# Patient Record
Sex: FEMALE | Race: WHITE | Employment: STUDENT | URBAN - METROPOLITAN AREA
[De-identification: names, ages, dates, MRNs, and addresses within clinical notes are randomized per-mention and may not be internally consistent; named-entity substitution may affect disease eponyms.]

---

## 2017-06-20 ENCOUNTER — HOSPITAL ENCOUNTER (EMERGENCY)
Age: 42
Discharge: HOME | End: 2017-06-20
Attending: EMERGENCY MEDICINE
Payer: MEDICAID

## 2017-06-20 ENCOUNTER — APPOINTMENT (OUTPATIENT)
Dept: GENERAL RADIOLOGY | Age: 42
End: 2017-06-20
Attending: EMERGENCY MEDICINE
Payer: MEDICAID

## 2017-06-20 ENCOUNTER — APPOINTMENT (OUTPATIENT)
Dept: CT IMAGING | Age: 42
End: 2017-06-20
Attending: EMERGENCY MEDICINE
Payer: MEDICAID

## 2017-06-20 ENCOUNTER — APPOINTMENT (OUTPATIENT)
Dept: ULTRASOUND IMAGING | Age: 42
End: 2017-06-20
Attending: EMERGENCY MEDICINE
Payer: MEDICAID

## 2017-06-20 VITALS
TEMPERATURE: 98 F | DIASTOLIC BLOOD PRESSURE: 88 MMHG | OXYGEN SATURATION: 97 % | RESPIRATION RATE: 16 BRPM | SYSTOLIC BLOOD PRESSURE: 126 MMHG | HEIGHT: 66 IN | WEIGHT: 284 LBS | BODY MASS INDEX: 45.64 KG/M2 | HEART RATE: 84 BPM

## 2017-06-20 DIAGNOSIS — R07.89 ATYPICAL CHEST PAIN: Primary | ICD-10-CM

## 2017-06-20 PROCEDURE — 71010 XR CHEST AP PORTABLE  (CPT=71010): CPT | Performed by: EMERGENCY MEDICINE

## 2017-06-20 PROCEDURE — 93005 ELECTROCARDIOGRAM TRACING: CPT

## 2017-06-20 PROCEDURE — 96361 HYDRATE IV INFUSION ADD-ON: CPT

## 2017-06-20 PROCEDURE — 81003 URINALYSIS AUTO W/O SCOPE: CPT | Performed by: EMERGENCY MEDICINE

## 2017-06-20 PROCEDURE — 96374 THER/PROPH/DIAG INJ IV PUSH: CPT

## 2017-06-20 PROCEDURE — 81025 URINE PREGNANCY TEST: CPT

## 2017-06-20 PROCEDURE — 93010 ELECTROCARDIOGRAM REPORT: CPT

## 2017-06-20 PROCEDURE — 99285 EMERGENCY DEPT VISIT HI MDM: CPT

## 2017-06-20 PROCEDURE — 76700 US EXAM ABDOM COMPLETE: CPT | Performed by: EMERGENCY MEDICINE

## 2017-06-20 PROCEDURE — 83690 ASSAY OF LIPASE: CPT | Performed by: EMERGENCY MEDICINE

## 2017-06-20 PROCEDURE — C9113 INJ PANTOPRAZOLE SODIUM, VIA: HCPCS | Performed by: EMERGENCY MEDICINE

## 2017-06-20 PROCEDURE — 84484 ASSAY OF TROPONIN QUANT: CPT | Performed by: EMERGENCY MEDICINE

## 2017-06-20 PROCEDURE — 71275 CT ANGIOGRAPHY CHEST: CPT | Performed by: EMERGENCY MEDICINE

## 2017-06-20 PROCEDURE — 85025 COMPLETE CBC W/AUTO DIFF WBC: CPT | Performed by: EMERGENCY MEDICINE

## 2017-06-20 PROCEDURE — 96375 TX/PRO/DX INJ NEW DRUG ADDON: CPT

## 2017-06-20 PROCEDURE — 80053 COMPREHEN METABOLIC PANEL: CPT | Performed by: EMERGENCY MEDICINE

## 2017-06-20 RX ORDER — ONDANSETRON 2 MG/ML
4 INJECTION INTRAMUSCULAR; INTRAVENOUS ONCE
Status: COMPLETED | OUTPATIENT
Start: 2017-06-20 | End: 2017-06-20

## 2017-06-20 RX ORDER — SODIUM CHLORIDE 9 MG/ML
INJECTION, SOLUTION INTRAVENOUS ONCE
Status: COMPLETED | OUTPATIENT
Start: 2017-06-20 | End: 2017-06-20

## 2017-06-20 RX ORDER — OMEPRAZOLE 20 MG/1
20 CAPSULE, DELAYED RELEASE ORAL DAILY
Qty: 30 CAPSULE | Refills: 0 | Status: SHIPPED | OUTPATIENT
Start: 2017-06-20 | End: 2018-06-20

## 2017-06-20 RX ORDER — KETOROLAC TROMETHAMINE 30 MG/ML
30 INJECTION, SOLUTION INTRAMUSCULAR; INTRAVENOUS ONCE
Status: COMPLETED | OUTPATIENT
Start: 2017-06-20 | End: 2017-06-20

## 2017-06-20 RX ORDER — CETIRIZINE HYDROCHLORIDE 10 MG/1
10 TABLET ORAL DAILY
COMMUNITY

## 2017-06-20 NOTE — ED PROVIDER NOTES
Patient Seen in: [facility] In [place]    History   Patient presents with:  Chest Pain Angina (cardiovascular)    Stated Complaint: chest pain/abd pain    HPI    57-year-old female comes the hospital to complaint of having difficulty 0741 97.5 °F (36.4 °C)   Temp src 06/20/17 0741 Temporal   SpO2 06/20/17 0741 96 %   O2 Device 06/20/17 0741 None (Room air)       Current:/82 mmHg  Pulse 73  Temp(Src) 98 °F (36.7 °C) (Temporal)  Resp 20  Ht 167.6 cm (5' 6\")  Wt 128.822 kg  BMI 45. MD (06/20/17 10:40:23)     Impression:     CONCLUSION:  No pulmonary embolism identified.           Dictated by: Abdullahi Burns MD on 6/20/2017 at 10:13       Approved by: Abdullahi Burns MD               Narrative:     PROCEDURE:  CT ANGIOGRAM OF THE DODIE echogenicity. No significant masses. BILIARY:  Normal appearing gallbladder, intrahepatic ducts, and common bile duct.  Common bile duct diameter is 3 mm.   PANCREAS:  Normal.  SPLEEN:  Normal.  KIDNEYS:  Normal.  Right kidney measures 10.9 cm.  Left kid[address]          Kelly Conner MD  [address]   [address] [place] [address]    Schedule an appointment as soon as possible for a visit in 2 days        Medications Prescribed:  Current Discharge Medication List    START taking the

## 2017-06-20 NOTE — ED NOTES
MD in the room. Explained to pt all lab results and discharge instructions. Voiced of understanding.

## 2019-11-04 ENCOUNTER — HOSPITAL ENCOUNTER (EMERGENCY)
Age: 44
Discharge: HOME | End: 2019-11-04
Attending: EMERGENCY MEDICINE

## 2019-11-04 ENCOUNTER — APPOINTMENT (OUTPATIENT)
Dept: GENERAL RADIOLOGY | Age: 44
End: 2019-11-04
Attending: EMERGENCY MEDICINE

## 2019-11-04 ENCOUNTER — APPOINTMENT (OUTPATIENT)
Dept: CT IMAGING | Age: 44
End: 2019-11-04
Attending: EMERGENCY MEDICINE

## 2019-11-04 VITALS
OXYGEN SATURATION: 98 % | RESPIRATION RATE: 18 BRPM | WEIGHT: 293 LBS | SYSTOLIC BLOOD PRESSURE: 118 MMHG | HEART RATE: 99 BPM | TEMPERATURE: 100 F | DIASTOLIC BLOOD PRESSURE: 86 MMHG | BODY MASS INDEX: 47.09 KG/M2 | HEIGHT: 66 IN

## 2019-11-04 DIAGNOSIS — J18.9 COMMUNITY ACQUIRED PNEUMONIA OF RIGHT UPPER LOBE OF LUNG: Primary | ICD-10-CM

## 2019-11-04 PROCEDURE — 99285 EMERGENCY DEPT VISIT HI MDM: CPT

## 2019-11-04 PROCEDURE — 96375 TX/PRO/DX INJ NEW DRUG ADDON: CPT

## 2019-11-04 PROCEDURE — 87502 INFLUENZA DNA AMP PROBE: CPT | Performed by: EMERGENCY MEDICINE

## 2019-11-04 PROCEDURE — 71046 X-RAY EXAM CHEST 2 VIEWS: CPT | Performed by: EMERGENCY MEDICINE

## 2019-11-04 PROCEDURE — 81003 URINALYSIS AUTO W/O SCOPE: CPT | Performed by: EMERGENCY MEDICINE

## 2019-11-04 PROCEDURE — 93005 ELECTROCARDIOGRAM TRACING: CPT

## 2019-11-04 PROCEDURE — 96361 HYDRATE IV INFUSION ADD-ON: CPT

## 2019-11-04 PROCEDURE — 96365 THER/PROPH/DIAG IV INF INIT: CPT

## 2019-11-04 PROCEDURE — 85025 COMPLETE CBC W/AUTO DIFF WBC: CPT | Performed by: EMERGENCY MEDICINE

## 2019-11-04 PROCEDURE — 70450 CT HEAD/BRAIN W/O DYE: CPT | Performed by: EMERGENCY MEDICINE

## 2019-11-04 PROCEDURE — 80053 COMPREHEN METABOLIC PANEL: CPT | Performed by: EMERGENCY MEDICINE

## 2019-11-04 PROCEDURE — 93010 ELECTROCARDIOGRAM REPORT: CPT

## 2019-11-04 RX ORDER — LEVOFLOXACIN 5 MG/ML
500 INJECTION, SOLUTION INTRAVENOUS ONCE
Status: COMPLETED | OUTPATIENT
Start: 2019-11-04 | End: 2019-11-04

## 2019-11-04 RX ORDER — LEVOFLOXACIN 500 MG/1
500 TABLET, FILM COATED ORAL DAILY
Qty: 10 TABLET | Refills: 0 | Status: SHIPPED | OUTPATIENT
Start: 2019-11-04 | End: 2019-11-14

## 2019-11-04 RX ORDER — KETOROLAC TROMETHAMINE 30 MG/ML
30 INJECTION, SOLUTION INTRAMUSCULAR; INTRAVENOUS ONCE
Status: COMPLETED | OUTPATIENT
Start: 2019-11-04 | End: 2019-11-04

## 2019-11-04 RX ORDER — ACETAMINOPHEN 500 MG
1000 TABLET ORAL ONCE
Status: DISCONTINUED | OUTPATIENT
Start: 2019-11-04 | End: 2019-11-04

## 2019-11-04 RX ORDER — MORPHINE SULFATE 4 MG/ML
4 INJECTION, SOLUTION INTRAMUSCULAR; INTRAVENOUS ONCE
Status: COMPLETED | OUTPATIENT
Start: 2019-11-04 | End: 2019-11-04

## 2019-11-04 RX ORDER — ONDANSETRON 4 MG/1
4 TABLET, ORALLY DISINTEGRATING ORAL EVERY 4 HOURS PRN
Qty: 10 TABLET | Refills: 0 | Status: SHIPPED | OUTPATIENT
Start: 2019-11-04 | End: 2019-11-11

## 2019-11-04 RX ORDER — DEXAMETHASONE SODIUM PHOSPHATE 4 MG/ML
10 VIAL (ML) INJECTION ONCE
Status: COMPLETED | OUTPATIENT
Start: 2019-11-04 | End: 2019-11-04

## 2019-11-04 RX ORDER — CODEINE PHOSPHATE AND GUAIFENESIN 10; 100 MG/5ML; MG/5ML
5 SOLUTION ORAL EVERY 6 HOURS PRN
Qty: 180 ML | Refills: 0 | Status: SHIPPED | OUTPATIENT
Start: 2019-11-04 | End: 2020-11-15

## 2019-11-04 RX ORDER — ONDANSETRON 2 MG/ML
4 INJECTION INTRAMUSCULAR; INTRAVENOUS ONCE
Status: COMPLETED | OUTPATIENT
Start: 2019-11-04 | End: 2019-11-04

## 2019-11-05 NOTE — ED PROVIDER NOTES
Patient Seen in: [facility] In [place]      History   Patient presents with:  Headache (neurologic)    Stated Complaint: headache     HPI    80-year-old female presents emergency department states she is been having a cough vomiting a pain distress, tearful  HEENT: Pupils equal react to light extraocular muscles intact no scleral icterus, mucous membranes are moist, there is no erythema or exudate in the posterior pharynx  Neck: Supple no JVD no lymphadenopathy no meningismus no carotid results for these tests on the individual orders. RAINBOW DRAW BLUE   RAINBOW DRAW LAVENDER   RAINBOW DRAW LIGHT GREEN   RAINBOW DRAW GOLD     EKG    Rate, intervals and axes as noted on EKG Report.   Rate: 124  Rhythm: Sinus Rhythm  Reading: Sinus tachyc Patient verbalizes and agrees with plan. Patient discharged in good condition.                 Disposition and Plan     Clinical Impression:  Community acquired pneumonia of right upper lobe of lung ([address]  (primary encounter diagnosis)    Disposition:  Dis

## 2020-11-15 ENCOUNTER — APPOINTMENT (OUTPATIENT)
Dept: GENERAL RADIOLOGY | Age: 45
End: 2020-11-15
Attending: PHYSICIAN ASSISTANT
Payer: COMMERCIAL

## 2020-11-15 ENCOUNTER — HOSPITAL ENCOUNTER (EMERGENCY)
Age: 45
Discharge: HOME | End: 2020-11-15
Attending: EMERGENCY MEDICINE
Payer: COMMERCIAL

## 2020-11-15 VITALS
HEIGHT: 65 IN | OXYGEN SATURATION: 99 % | BODY MASS INDEX: 48.82 KG/M2 | DIASTOLIC BLOOD PRESSURE: 101 MMHG | WEIGHT: 293 LBS | SYSTOLIC BLOOD PRESSURE: 216 MMHG | TEMPERATURE: 98 F | RESPIRATION RATE: 18 BRPM | HEART RATE: 92 BPM

## 2020-11-15 DIAGNOSIS — M25.562 ACUTE PAIN OF LEFT KNEE: Primary | ICD-10-CM

## 2020-11-15 DIAGNOSIS — S83.92XA SPRAIN OF LEFT KNEE, UNSPECIFIED LIGAMENT, INITIAL ENCOUNTER: ICD-10-CM

## 2020-11-15 DIAGNOSIS — I10 HYPERTENSION, UNSPECIFIED TYPE: ICD-10-CM

## 2020-11-15 PROCEDURE — 99284 EMERGENCY DEPT VISIT MOD MDM: CPT

## 2020-11-15 PROCEDURE — 73562 X-RAY EXAM OF KNEE 3: CPT | Performed by: PHYSICIAN ASSISTANT

## 2020-11-15 PROCEDURE — 99283 EMERGENCY DEPT VISIT LOW MDM: CPT

## 2020-11-15 RX ORDER — PAROXETINE HYDROCHLORIDE 40 MG/1
40 TABLET, FILM COATED ORAL EVERY MORNING
COMMUNITY

## 2020-11-15 RX ORDER — HYDROCODONE BITARTRATE AND ACETAMINOPHEN 5; 325 MG/1; MG/1
1 TABLET ORAL ONCE
Status: COMPLETED | OUTPATIENT
Start: 2020-11-15 | End: 2020-11-15

## 2020-11-15 RX ORDER — HYDROCODONE BITARTRATE AND ACETAMINOPHEN 5; 325 MG/1; MG/1
1-2 TABLET ORAL EVERY 6 HOURS PRN
Qty: 10 TABLET | Refills: 0 | Status: SHIPPED | OUTPATIENT
Start: 2020-11-15 | End: 2020-11-22

## 2020-11-15 RX ORDER — BUTALBITAL, ACETAMINOPHEN AND CAFFEINE 50; 325; 40 MG/1; MG/1; MG/1
1 TABLET ORAL EVERY 4 HOURS PRN
COMMUNITY

## 2020-11-16 NOTE — ED NOTES
Assisted pt to restroom via wheelchair, Pt educated about not taking her BP medications today, states that she did not take it because it makes her void more frequently. Educated her.

## 2020-11-16 NOTE — ED INITIAL ASSESSMENT (HPI)
Left knee pain over last 3-4 days. Renay Paiz \"pop\" when got out of chair and now unable to bear weight.

## 2020-11-16 NOTE — ED PROVIDER NOTES
Patient Seen in: [facility] In [place]      History   Patient presents with:  Musculoskeletal Problem    Stated Complaint: left knee injury    72-year-old obese  female with history of hypertension, but did not take her medic meniscus and no MCL laxity. Tenderness found. Medial joint line and patellar tendon tenderness noted. No lateral joint line, no MCL and no LCL tenderness noted. Skin:     General: Skin is warm and dry. Neurological:      Mental Status: She is alert. MD  [address] [facility]  [phone]      Follow up on Monday. Call for appointment.           Medications Prescribed:  Current Discharge Medication List    START taking these medications    HYDROcodone-acetaminophen 5-325 MG Or

## 2021-11-16 ENCOUNTER — HOSPITAL ENCOUNTER (OUTPATIENT)
Facility: HOSPITAL | Age: 46
Setting detail: OBSERVATION
Discharge: HOME | DRG: 177 | End: 2021-11-18
Attending: EMERGENCY MEDICINE | Admitting: HOSPITALIST
Payer: MEDICAID

## 2021-11-16 ENCOUNTER — APPOINTMENT (OUTPATIENT)
Dept: GENERAL RADIOLOGY | Age: 46
DRG: 177 | End: 2021-11-16
Attending: EMERGENCY MEDICINE
Payer: MEDICAID

## 2021-11-16 DIAGNOSIS — U07.1 COVID-19: Primary | ICD-10-CM

## 2021-11-16 PROCEDURE — 71045 X-RAY EXAM CHEST 1 VIEW: CPT | Performed by: EMERGENCY MEDICINE

## 2021-11-16 PROCEDURE — 3E0333Z INTRODUCTION OF ANTI-INFLAMMATORY INTO PERIPHERAL VEIN, PERCUTANEOUS APPROACH: ICD-10-PCS | Performed by: INTERNAL MEDICINE

## 2021-11-16 RX ORDER — ALBUTEROL SULFATE 90 UG/1
8 AEROSOL, METERED RESPIRATORY (INHALATION) ONCE
Status: COMPLETED | OUTPATIENT
Start: 2021-11-16 | End: 2021-11-16

## 2021-11-16 RX ORDER — DEXAMETHASONE SODIUM PHOSPHATE 10 MG/ML
6 INJECTION, SOLUTION INTRAMUSCULAR; INTRAVENOUS ONCE
Status: COMPLETED | OUTPATIENT
Start: 2021-11-16 | End: 2021-11-16

## 2021-11-17 PROCEDURE — 99223 1ST HOSP IP/OBS HIGH 75: CPT | Performed by: HOSPITALIST

## 2021-11-17 PROCEDURE — XW033E5 INTRODUCTION OF REMDESIVIR ANTI-INFECTIVE INTO PERIPHERAL VEIN, PERCUTANEOUS APPROACH, NEW TECHNOLOGY GROUP 5: ICD-10-PCS | Performed by: INTERNAL MEDICINE

## 2021-11-17 RX ORDER — CALCIUM CARBONATE 500(1250)
500 TABLET ORAL
Status: DISCONTINUED | OUTPATIENT
Start: 2021-11-17 | End: 2021-11-18

## 2021-11-17 RX ORDER — HYDROCODONE BITARTRATE AND HOMATROPINE METHYLBROMIDE ORAL SOLUTION 5; 1.5 MG/5ML; MG/5ML
5 LIQUID ORAL EVERY 4 HOURS PRN
Status: DISCONTINUED | OUTPATIENT
Start: 2021-11-17 | End: 2021-11-18

## 2021-11-17 RX ORDER — MELATONIN
3 NIGHTLY PRN
Status: DISCONTINUED | OUTPATIENT
Start: 2021-11-17 | End: 2021-11-18

## 2021-11-17 RX ORDER — ACETAMINOPHEN 325 MG/1
650 TABLET ORAL EVERY 6 HOURS PRN
Status: DISCONTINUED | OUTPATIENT
Start: 2021-11-17 | End: 2021-11-18

## 2021-11-17 RX ORDER — ALBUTEROL SULFATE 90 UG/1
AEROSOL, METERED RESPIRATORY (INHALATION) EVERY 4 HOURS PRN
Status: DISCONTINUED | OUTPATIENT
Start: 2021-11-17 | End: 2021-11-17

## 2021-11-17 RX ORDER — BUTALBITAL, ACETAMINOPHEN AND CAFFEINE 50; 325; 40 MG/1; MG/1; MG/1
1 TABLET ORAL EVERY 4 HOURS PRN
Status: DISCONTINUED | OUTPATIENT
Start: 2021-11-17 | End: 2021-11-18

## 2021-11-17 RX ORDER — PAROXETINE HYDROCHLORIDE 20 MG/1
40 TABLET, FILM COATED ORAL EVERY MORNING
Status: DISCONTINUED | OUTPATIENT
Start: 2021-11-17 | End: 2021-11-18

## 2021-11-17 RX ORDER — POLYETHYLENE GLYCOL 3350 17 G/17G
17 POWDER, FOR SOLUTION ORAL DAILY PRN
Status: DISCONTINUED | OUTPATIENT
Start: 2021-11-17 | End: 2021-11-18

## 2021-11-17 RX ORDER — BISACODYL 10 MG
10 SUPPOSITORY, RECTAL RECTAL
Status: DISCONTINUED | OUTPATIENT
Start: 2021-11-17 | End: 2021-11-18

## 2021-11-17 RX ORDER — MULTIPLE VITAMINS W/ MINERALS TAB 9MG-400MCG
1 TAB ORAL DAILY
Status: DISCONTINUED | OUTPATIENT
Start: 2021-11-17 | End: 2021-11-18

## 2021-11-17 RX ORDER — CETIRIZINE HYDROCHLORIDE 10 MG/1
10 TABLET ORAL DAILY
Status: DISCONTINUED | OUTPATIENT
Start: 2021-11-17 | End: 2021-11-18

## 2021-11-17 RX ORDER — GUAIFENESIN 600 MG
600 TABLET, EXTENDED RELEASE 12 HR ORAL 2 TIMES DAILY
Status: DISCONTINUED | OUTPATIENT
Start: 2021-11-17 | End: 2021-11-18

## 2021-11-17 RX ORDER — CHOLECALCIFEROL (VITAMIN D3) 125 MCG
2000 CAPSULE ORAL DAILY
Status: DISCONTINUED | OUTPATIENT
Start: 2021-11-17 | End: 2021-11-18

## 2021-11-17 RX ORDER — DEXAMETHASONE SODIUM PHOSPHATE 4 MG/ML
6 VIAL (ML) INJECTION EVERY 24 HOURS
Status: DISCONTINUED | OUTPATIENT
Start: 2021-11-17 | End: 2021-11-18

## 2021-11-17 RX ORDER — ONDANSETRON 2 MG/ML
8 INJECTION INTRAMUSCULAR; INTRAVENOUS EVERY 6 HOURS PRN
Status: DISCONTINUED | OUTPATIENT
Start: 2021-11-17 | End: 2021-11-18

## 2021-11-17 RX ORDER — ENOXAPARIN SODIUM 100 MG/ML
0.5 INJECTION SUBCUTANEOUS DAILY
Status: DISCONTINUED | OUTPATIENT
Start: 2021-11-18 | End: 2021-11-18

## 2021-11-17 RX ORDER — BENZONATATE 200 MG/1
200 CAPSULE ORAL 3 TIMES DAILY PRN
Status: DISCONTINUED | OUTPATIENT
Start: 2021-11-17 | End: 2021-11-18

## 2021-11-17 RX ORDER — ERGOCALCIFEROL (VITAMIN D2) 10 MCG
TABLET ORAL
COMMUNITY

## 2021-11-17 RX ORDER — ENOXAPARIN SODIUM 100 MG/ML
40 INJECTION SUBCUTANEOUS DAILY
Status: DISCONTINUED | OUTPATIENT
Start: 2021-11-17 | End: 2021-11-17

## 2021-11-17 RX ORDER — ALBUTEROL SULFATE 90 UG/1
4 AEROSOL, METERED RESPIRATORY (INHALATION) EVERY 4 HOURS PRN
Status: DISCONTINUED | OUTPATIENT
Start: 2021-11-17 | End: 2021-11-18

## 2021-11-17 NOTE — PROGRESS NOTES
COVID-19 Daily Discharge Readiness-Nursing    O2 Sat at Rest:     98%  RA  O2 Sat with Exertion:    % on    liters   Temperature max from last 24 hrs: Temp (24hrs), Av °F (36.7 °C), Min:97.7 °F (36.5 °C), Max:98.4 °F (36.9 °C)    Inflammatory Markers:

## 2021-11-17 NOTE — ED PROVIDER NOTES
Patient Seen in: THE [facility] In [place]      History   Patient presents with:  Difficulty Breathing    Stated Complaint: dx covid last wednesday, SOB - 94% in triage    Subjective:   HPI    Patient with a history of high blood pressure Ht 165.1 cm (5' 5\")   Wt 127 kg   LMP 11/16/2021   SpO2 96%   BMI 46.59 kg/m²         Physical Exam  General: The patient is awake, alert, conversant.   Patient answers questions quickly and appropriately but can only speak in a few word sentences because WITH DIFFERENTIAL WITH PLATELET    Narrative: The following orders were created for panel order CBC With Differential With Platelet.   Procedure                               Abnormality         Status                     --------- Hospital Problems             Present on Admission           ICD-10-CM Noted POA    * (Principal) COVID-19 U07.1 11/16/2021 Unknown

## 2021-11-17 NOTE — PROGRESS NOTES
[facility] Pharmacy Note:  Anticoagulation Weight Dose Adjustment for enoxaparin    Oliver Handley is a 55year old patient who has been prescribed enoxaparin 40 mg every 24 hours. Estimated Creatinine Clearance: 84.3 mL/min (based on SCr of 0.75 mg/dL).  Kacie Garcia

## 2021-11-17 NOTE — H&P
NAV HOSPITALIST  History and Physical     Santino Lee Patient Status:  Emergency    1975 MRN [id number]   Location [facility] Attending No att. providers found   [facility] Day # 0 PCP Prieto Freeman MD     Chief Co distress. Alert and oriented x 3. HEENT: Normocephalic atraumatic. Moist mucous membranes. EOM-I. PERRLA. Anicteric. Neck: No lymphadenopathy. No JVD. No carotid bruits. Respiratory: Clear to auscultation bilaterally. No wheezes. No rhonchi.   Cardiovasc is negative, may discontinue isolation: n/a     Plan of care discussed with patient and er md Meghan Lund MD  11/17/2021

## 2021-11-17 NOTE — PLAN OF CARE
COVID-19 Daily Discharge Readiness-Nursing    O2 Sat at Rest:     %   O2 Sat with Exertion:    % on    liters   Temperature max from last 24 hrs: Temp (24hrs), Av.9 °F (36.6 °C), Min:97.5 °F (36.4 °C), Max:98.4 °F (36.9 °C)    Inflammatory Markers:   R Goal:   11/17 AM: decrease pain in right wrist    Interventions:   -  continue covid medications  - See additional Care Plan goals for specific interventions  Outcome: Progressing

## 2021-11-17 NOTE — ED QUICK NOTES
Orders for admission, patient is aware of plan and ready to go upstairs. Any questions, please call ED RN Sivan Head  at extension [phone].      Vaccinated?per pt yes  Type of COVID test sent:NA  COVID Suspicion level: Low/High HIGH      Titratable drug(s) infusin

## 2021-11-17 NOTE — CONSULTS
INFECTIOUS DISEASE CONSULT NOTE    George Paiz Patient Status:  Inpatient    1975 MRN [id number]   [facility] 5NW-A Attending Ayana Cole MD   Hosp Day # 0 PCP Keyon Allan MD       Reason for Consultation:  Covid 19 infectio tab 3 mg, 3 mg, Oral, Nightly PRN  •  polyethylene glycol (PEG 3350) (MIRALAX) powder packet 17 g, 17 g, Oral, Daily PRN  •  magnesium hydroxide (MILK OF MAGNESIA) 400 MG/5ML suspension 30 mL, 30 mL, Oral, Daily PRN  •  bisacodyl (DULCOLAX) rectal supposit 11/17/21  0724   GLU 91 123*   BUN 15 15   CREATSERUM 0.76 0.75   GFRAA 109 111   GFRNAA 94 96   CA 9.2 9.9   ALB 3.4 3.5    143   K 4.2 4.5    110   CO2 26.0 24.0   ALKPHO 69 70   AST 16 15   ALT 29 30   BILT 0.3 0.3   TP 7.9 7.4     No result granuloma appear similar to the prior. Mediastinal contours are smooth. CP angles remain sharp. CONCLUSION:  New basilar opacities consistent with mild radiographic findings of COVID-19 pneumonia. Continued clinical correlation recommended.

## 2021-11-17 NOTE — ED INITIAL ASSESSMENT (HPI)
Pt to ed from home with c/o nelli, cough, chest heaviness pt test pos for covid last wed, sx have gotten worse,loss of taste smell,

## 2021-11-17 NOTE — DIETARY NOTE
[facility]  NUTRITION ASSESSMENT    Pt does not meet malnutrition criteria. NUTRITION INTERVENTION:    1. Meal and Snacks - Liberalize diet to Regular as tolerated; monitor patient po intake. Encourage adequate po of appropriate diet.   2. Medical F No; Misc Restriction: Cardiac   Oral Supplements: None    Percent Meals Eaten (last 3 days)     None          FOOD/NUTRITION RELATED HISTORY:   Appetite: Fair  Intake: Decreased for >1 week  Intake Meeting Needs: No, but supplements to maximize  Food Aller

## 2021-11-18 VITALS
OXYGEN SATURATION: 98 % | TEMPERATURE: 98 F | WEIGHT: 282.44 LBS | BODY MASS INDEX: 47.06 KG/M2 | HEART RATE: 67 BPM | SYSTOLIC BLOOD PRESSURE: 147 MMHG | RESPIRATION RATE: 17 BRPM | DIASTOLIC BLOOD PRESSURE: 68 MMHG | HEIGHT: 65 IN

## 2021-11-18 PROCEDURE — 99239 HOSP IP/OBS DSCHRG MGMT >30: CPT | Performed by: HOSPITALIST

## 2021-11-18 RX ORDER — GUAIFENESIN AND DEXTROMETHORPHAN HYDROBROMIDE 600; 30 MG/1; MG/1
1 TABLET, EXTENDED RELEASE ORAL EVERY 12 HOURS
Qty: 14 TABLET | Refills: 0 | Status: SHIPPED | OUTPATIENT
Start: 2021-11-18 | End: 2021-11-18

## 2021-11-18 RX ORDER — LEVALBUTEROL INHALATION SOLUTION 1.25 MG/3ML
1.25 SOLUTION RESPIRATORY (INHALATION) EVERY 4 HOURS PRN
Qty: 240 ML | Refills: 0 | Status: SHIPPED | OUTPATIENT
Start: 2021-11-18 | End: 2021-11-18

## 2021-11-18 RX ORDER — PREDNISONE 10 MG/1
TABLET ORAL
Qty: 30 TABLET | Refills: 0 | Status: SHIPPED | OUTPATIENT
Start: 2021-11-18

## 2021-11-18 RX ORDER — PREDNISONE 10 MG/1
TABLET ORAL
Qty: 30 TABLET | Refills: 0 | Status: SHIPPED | OUTPATIENT
Start: 2021-11-18 | End: 2021-11-18

## 2021-11-18 RX ORDER — LEVALBUTEROL INHALATION SOLUTION 1.25 MG/3ML
1.25 SOLUTION RESPIRATORY (INHALATION) EVERY 4 HOURS PRN
Qty: 240 ML | Refills: 0 | Status: SHIPPED | OUTPATIENT
Start: 2021-11-18

## 2021-11-18 RX ORDER — BENZONATATE 200 MG/1
200 CAPSULE ORAL 3 TIMES DAILY PRN
Qty: 30 CAPSULE | Refills: 0 | Status: SHIPPED | OUTPATIENT
Start: 2021-11-18

## 2021-11-18 RX ORDER — GUAIFENESIN AND DEXTROMETHORPHAN HYDROBROMIDE 600; 30 MG/1; MG/1
1 TABLET, EXTENDED RELEASE ORAL EVERY 12 HOURS
Qty: 14 TABLET | Refills: 0 | Status: SHIPPED | OUTPATIENT
Start: 2021-11-18 | End: 2021-11-25

## 2021-11-18 RX ORDER — ALBUTEROL SULFATE 2.5 MG/3ML
2.5 SOLUTION RESPIRATORY (INHALATION) EVERY 6 HOURS PRN
Qty: 360 ML | Refills: 3 | Status: SHIPPED | OUTPATIENT
Start: 2021-11-18 | End: 2021-11-18

## 2021-11-18 RX ORDER — BENZONATATE 200 MG/1
200 CAPSULE ORAL 3 TIMES DAILY PRN
Qty: 30 CAPSULE | Refills: 0 | Status: SHIPPED | OUTPATIENT
Start: 2021-11-18 | End: 2021-11-18

## 2021-11-18 NOTE — PROGRESS NOTES
NURSING DISCHARGE NOTE    Discharged Home via Wheelchair. Accompanied by Support staff  Belongings Taken by patient/family. Pt assessed and ready for dc. Iv dcd. Instructions gone over with pt, no further questions.

## 2021-11-18 NOTE — PLAN OF CARE
Problem: Patient/Family Goals  Goal: Patient/Family Long Term Goal  Description: Patient's Long Term Goal: go home    Interventions:  - poc  - See additional Care Plan goals for specific interventions  Outcome: Adequate for Discharge  Goal: Patient/Famil

## 2021-11-18 NOTE — PROGRESS NOTES
INFECTIOUS DISEASE PROGRESS NOTE    [facility] Patient Status:  Inpatient    1975 MRN [id number]   [facility] 5NW-A Attending Inocente Homans, MD   Hosp Day # 1 PCP Rosa Soto MD     Remdesivir d#2  Dexa      Subjective: Pt not 60 mg, 0.5 mg/kg, Subcutaneous, Daily    Review of Systems:    Completed. See pertinent positives and negatives as above        Physical Exam:    Vital signs: Blood pressure 147/68, pulse 67, temperature 98 °F (36.7 °C), temperature source Oral, resp.  rate 11/17/21  0724 11/18/21  0656   CRP 2.02* 1.16* 0.46*   ETHAN 54.6 53.1 38.4    175 156   DDIMER <0.27 0.28 0.31       Microbiology    Reviewed in EMR,     Radiology: XR CHEST AP PORTABLE  (CPT=71045)    Result Date: 11/16/2021  PROCEDURE:  XR CHEST A cough and steroids/nebs per hospitalists.   D/w pt over the phone and with ALEM Lopez MD

## 2021-11-18 NOTE — PLAN OF CARE
COVID-19 Daily Discharge Readiness-Nursing    O2 Sat at Rest:    97 % RA   O2 Sat with Exertion:    % on    liters   Temperature max from last 24 hrs: Temp (24hrs), Av.8 °F (36.6 °C), Min:97.5 °F (36.4 °C), Max:98.1 °F (36.7 °C)    Inflammatory Markers

## 2021-11-18 NOTE — PAYOR COMM NOTE
--------------  ADMISSION REVIEW     Payor: [address] [place] [address] #:  [id number]  Authorization Number: [id number]    Admit date: 11/17/21  Admit time:  4:30 AM       REVIEW DOCUMENTATION:     ED Provider Notes      ED Provider Notes signed b in triage  Other systems are as noted in HPI. Constitutional and vital signs reviewed. All other systems reviewed and negative except as noted above.     Physical Exam     ED Triage Vitals   BP 11/16/21 1853 (!) 181/100   Pulse 11/16/21 1853 80   Resp Abnormal; Notable for the following components:    Ketones Urine 15  (*)     Protein Urine 30 mg/dL (*)     All other components within normal limits   UA MICROSCOPIC ONLY, URINE - Abnormal; Notable for the following components:    RBC Urine 3-5 (*)     Ba radiographic findings of COVID-19 pneumonia.  Continued clinical correlation recommended. Admission disposition: 11/16/2021  9:38 PM       I spoke with hospitalist, Dr. Kodak Diaz.   He will admit and make further recommendations smokeless tobacco.    Family History: htn    Allergies:   Clindamycin             ITCHING    Medications:  No current facility-administered medications on file prior to encounter.   PARoxetine HCl 40 MG Oral Tab, Take 40 mg by mouth every morning., Disp: , ALB 3.4      K 4.2      CO2 26.0   ALKPHO 69   AST 16   ALT 29   BILT 0.3   TP 7.9       Estimated Creatinine Clearance: 83.2 mL/min (based on SCr of 0.76 mg/dL). No results for input(s): PTP, INR in the last 168 hours.     COVID-19 Lab Res Route User    11/18/2021 0804 Given 600 mg Oral Gris Molina RN    11/17/2021 2002 Given 600 mg Oral Lubna Nely RN      hydrocodone-homatropine (HYDROMET) 5-1.5 MG/5ML syrup 5 mL     Date Action Dose Route User    11/18/2021 0804 Given 5 mL Oral Keegan Dnaiels (Room air) — CT    11/17/21 1606 97.9 °F (36.6 °C) — 18 144/71 — — — — [facility]    11/17/21 1215 98 °F (36.7 °C) 67 17 142/84 94 % — None (Room air) — CT    11/17/21 0700 97.5 °F (36.4 °C) 89 18 155/86 98 % — None (Room air) — CT    11/17/21 0505 — 81 — 157/93 — 5. 9   .0 446. 0           Recent Labs   Lab 11/16/21 2036 11/17/21  0724   GLU 91 123*   BUN 15 15   CREATSERUM 0.76 0.75   GFRAA 109 111   GFRNAA 94 96   CA 9.2 9.9   ALB 3.4 3.5    143   K 4.2 4.5    110   CO2 26.0 24.0   ALKPHO 69 70 ALKPHO 69 70 59   AST 16 15 14*   ALT 29 30 26   BILT 0.3 0.3 0.2   TP 7.9 7.4 6.4      Inflammatory Markers        Recent Labs   Lab 11/16/21 2036 11/17/21  0724 11/18/21  0656   CRP 2.02* 1.16* 0.46*   ETHAN 54.6 53.1 38.4    175 156   DDIMER <0.

## 2021-11-18 NOTE — PROGRESS NOTES
[facility]     Hospitalist Progress Note     Humphrey Segura Patient Status:  Inpatient    1975 MRN [id number]   [facility] 5NW-A Attending Brendan Laguerre MD   Hosp Day # 1 PCP Teresa Abdi MD     Chief Complaint: Eze Guardado Markers  Recent Labs   Lab 11/16/21 2036 11/17/21  0724 11/18/21  0656   CRP 2.02* 1.16* 0.46*   ETHAN 54.6 53.1 38.4    175 156   DDIMER <0.27 0.28 0.31       Imaging: Imaging data reviewed in Epic.     Medications:   • guaiFENesin ER  600 mg Oral BI

## 2021-11-19 NOTE — DISCHARGE SUMMARY
[facility] HOSPITALIST  DISCHARGE SUMMARY     Mallika Choi Patient Status:  Inpatient    1975 MRN [id number]   [facility] 5NW-A Attending No att. providers found   Hosp Day # 1 PCP Tata Paiz MD     Date of Admission: 2021  Da Stop taking on: November 25, 2021  Quantity: 14 tablet  Refills: 0     predniSONE 10 MG Tabs  Commonly known as: DELTASONE      Take 4 Tabs * 3 Days, then take 3 Tabs * 3 Days, then take 2 Tabs * 3 days, then take 1 tab * 3 Days   Quantity: 30 tablet  Refi S1, S2. Regular rate and rhythm. No murmurs, rubs or gallops. Abdomen: Soft, nontender, nondistended. Positive bowel sounds. No rebound or guarding. Neurologic: No focal neurological deficits. Musculoskeletal: Moves all extremities.   Extremities: No

## 2021-11-19 NOTE — PAYOR COMM NOTE
PLEASE FAX DETERMINATION  THANKS!      DISCHARGE REVIEW    Payor: [address] [place] [address] #:  [id number]  Authorization Number: [id number]    Admit date: 11/17/21  Admit time:   4:30 AM  Discharge Date: 11/18/2021  6:04 PM     Admitting Physicia

## 2022-09-08 NOTE — ED NOTES
----- Message from Janis Awan sent at 9/8/2022  3:58 PM CDT -----  Suly with [facility] called regarding pt have temp of 103 and no other symptoms and she did blood work on Tuesday. Call Suly back at [phone]. Thx. EL     Patient updated with plan of care.

## 2023-01-04 ENCOUNTER — APPOINTMENT (OUTPATIENT)
Dept: GENERAL RADIOLOGY | Age: 48
End: 2023-01-04
Attending: EMERGENCY MEDICINE
Payer: MEDICAID

## 2023-01-04 ENCOUNTER — HOSPITAL ENCOUNTER (EMERGENCY)
Age: 48
Discharge: HOME | End: 2023-01-04
Attending: EMERGENCY MEDICINE
Payer: MEDICAID

## 2023-01-04 VITALS
TEMPERATURE: 97 F | WEIGHT: 293 LBS | RESPIRATION RATE: 18 BRPM | SYSTOLIC BLOOD PRESSURE: 128 MMHG | HEART RATE: 87 BPM | OXYGEN SATURATION: 99 % | DIASTOLIC BLOOD PRESSURE: 65 MMHG | BODY MASS INDEX: 48.82 KG/M2 | HEIGHT: 65 IN

## 2023-01-04 DIAGNOSIS — J01.90 ACUTE SINUSITIS, RECURRENCE NOT SPECIFIED, UNSPECIFIED LOCATION: ICD-10-CM

## 2023-01-04 DIAGNOSIS — J06.9 UPPER RESPIRATORY TRACT INFECTION, UNSPECIFIED TYPE: Primary | ICD-10-CM

## 2023-01-04 LAB — SARS-COV-2 RNA RESP QL NAA+PROBE: NOT DETECTED

## 2023-01-04 PROCEDURE — 99284 EMERGENCY DEPT VISIT MOD MDM: CPT

## 2023-01-04 PROCEDURE — 71046 X-RAY EXAM CHEST 2 VIEWS: CPT | Performed by: EMERGENCY MEDICINE

## 2023-01-04 RX ORDER — ALBUTEROL SULFATE 90 UG/1
2 AEROSOL, METERED RESPIRATORY (INHALATION) EVERY 4 HOURS PRN
Qty: 1 EACH | Refills: 0 | Status: SHIPPED | OUTPATIENT
Start: 2023-01-04 | End: 2023-02-03

## 2023-01-04 RX ORDER — ONDANSETRON 4 MG/1
4 TABLET, ORALLY DISINTEGRATING ORAL EVERY 4 HOURS PRN
Qty: 10 TABLET | Refills: 0 | Status: SHIPPED | OUTPATIENT
Start: 2023-01-04 | End: 2023-01-04 | Stop reason: RX

## 2023-01-04 RX ORDER — ALBUTEROL SULFATE 2.5 MG/3ML
2.5 SOLUTION RESPIRATORY (INHALATION) EVERY 4 HOURS PRN
Qty: 30 EACH | Refills: 0 | Status: SHIPPED | OUTPATIENT
Start: 2023-01-04 | End: 2023-02-03

## 2023-01-04 RX ORDER — AMOXICILLIN AND CLAVULANATE POTASSIUM 875; 125 MG/1; MG/1
1 TABLET, FILM COATED ORAL 2 TIMES DAILY
Qty: 20 TABLET | Refills: 0 | Status: SHIPPED | OUTPATIENT
Start: 2023-01-04 | End: 2023-01-04 | Stop reason: RX

## 2023-01-04 RX ORDER — ALBUTEROL SULFATE 2.5 MG/3ML
2.5 SOLUTION RESPIRATORY (INHALATION) EVERY 4 HOURS PRN
Qty: 30 EACH | Refills: 0 | Status: SHIPPED | OUTPATIENT
Start: 2023-01-04 | End: 2023-01-04 | Stop reason: RX

## 2023-01-04 RX ORDER — ALBUTEROL SULFATE 2.5 MG/3ML
2.5 SOLUTION RESPIRATORY (INHALATION) ONCE
Status: COMPLETED | OUTPATIENT
Start: 2023-01-04 | End: 2023-01-04

## 2023-01-04 RX ORDER — ALBUTEROL SULFATE 90 UG/1
2 AEROSOL, METERED RESPIRATORY (INHALATION) EVERY 4 HOURS PRN
Qty: 1 EACH | Refills: 0 | Status: SHIPPED | OUTPATIENT
Start: 2023-01-04 | End: 2023-01-04 | Stop reason: RX

## 2023-01-04 RX ORDER — PREDNISONE 20 MG/1
40 TABLET ORAL DAILY
Qty: 10 TABLET | Refills: 0 | Status: SHIPPED | OUTPATIENT
Start: 2023-01-04 | End: 2023-01-04 | Stop reason: RX

## 2023-01-04 RX ORDER — ONDANSETRON 4 MG/1
4 TABLET, ORALLY DISINTEGRATING ORAL EVERY 4 HOURS PRN
Qty: 10 TABLET | Refills: 0 | Status: SHIPPED | OUTPATIENT
Start: 2023-01-04 | End: 2023-01-11

## 2023-01-04 RX ORDER — PREDNISONE 20 MG/1
40 TABLET ORAL DAILY
Qty: 10 TABLET | Refills: 0 | Status: SHIPPED | OUTPATIENT
Start: 2023-01-04 | End: 2023-01-09

## 2023-01-04 RX ORDER — PREDNISONE 20 MG/1
40 TABLET ORAL ONCE
Status: COMPLETED | OUTPATIENT
Start: 2023-01-04 | End: 2023-01-04

## 2023-01-04 RX ORDER — ONDANSETRON 4 MG/1
4 TABLET, ORALLY DISINTEGRATING ORAL ONCE
Status: COMPLETED | OUTPATIENT
Start: 2023-01-04 | End: 2023-01-04

## 2023-01-04 RX ORDER — AMOXICILLIN AND CLAVULANATE POTASSIUM 875; 125 MG/1; MG/1
1 TABLET, FILM COATED ORAL 2 TIMES DAILY
Qty: 20 TABLET | Refills: 0 | Status: SHIPPED | OUTPATIENT
Start: 2023-01-04 | End: 2023-01-14

## 2023-01-05 NOTE — DISCHARGE INSTRUCTIONS
Rest, plenty of fluids. Follow-up for further evaluation with primary physician. Call for appointment. Return if new or worse symptoms. Start prednisone tomorrow. Augmentin as prescribed. Zofran as needed. Albuterol as discussed.

## 2024-02-19 ENCOUNTER — HOSPITAL ENCOUNTER (EMERGENCY)
Age: 49
Discharge: HOME | End: 2024-02-19
Payer: MEDICAID

## 2024-02-19 VITALS
WEIGHT: 290 LBS | DIASTOLIC BLOOD PRESSURE: 79 MMHG | OXYGEN SATURATION: 100 % | HEART RATE: 78 BPM | SYSTOLIC BLOOD PRESSURE: 163 MMHG | HEIGHT: 66 IN | RESPIRATION RATE: 20 BRPM | TEMPERATURE: 97 F | BODY MASS INDEX: 46.61 KG/M2

## 2024-02-19 DIAGNOSIS — B02.9 HERPES ZOSTER WITHOUT COMPLICATION: Primary | ICD-10-CM

## 2024-02-19 DIAGNOSIS — R11.2 NAUSEA AND VOMITING IN ADULT: ICD-10-CM

## 2024-02-19 LAB
ALBUMIN SERPL-MCNC: 3.4 G/DL (ref 3.4–5)
ALBUMIN/GLOB SERPL: 0.9 {RATIO} (ref 1–2)
ALP LIVER SERPL-CCNC: 40 U/L
ALT SERPL-CCNC: 19 U/L
ANION GAP SERPL CALC-SCNC: 4 MMOL/L (ref 0–18)
AST SERPL-CCNC: 25 U/L (ref 15–37)
BASOPHILS # BLD AUTO: 0.03 X10(3) UL (ref 0–0.2)
BASOPHILS NFR BLD AUTO: 0.5 %
BILIRUB SERPL-MCNC: 0.4 MG/DL (ref 0.1–2)
BUN BLD-MCNC: 15 MG/DL (ref 9–23)
CALCIUM BLD-MCNC: 8.6 MG/DL (ref 8.5–10.1)
CHLORIDE SERPL-SCNC: 110 MMOL/L (ref 98–112)
CO2 SERPL-SCNC: 25 MMOL/L (ref 21–32)
CREAT BLD-MCNC: 0.91 MG/DL
EGFRCR SERPLBLD CKD-EPI 2021: 78 ML/MIN/1.73M2 (ref 60–?)
EOSINOPHIL # BLD AUTO: 0.1 X10(3) UL (ref 0–0.7)
EOSINOPHIL NFR BLD AUTO: 1.6 %
ERYTHROCYTE [DISTWIDTH] IN BLOOD BY AUTOMATED COUNT: 15 %
GLOBULIN PLAS-MCNC: 3.7 G/DL (ref 2.8–4.4)
GLUCOSE BLD-MCNC: 101 MG/DL (ref 70–99)
HCT VFR BLD AUTO: 39.4 %
HGB BLD-MCNC: 13.4 G/DL
IMM GRANULOCYTES # BLD AUTO: 0.01 X10(3) UL (ref 0–1)
IMM GRANULOCYTES NFR BLD: 0.2 %
LIPASE SERPL-CCNC: 28 U/L (ref 13–75)
LYMPHOCYTES # BLD AUTO: 1.71 X10(3) UL (ref 1–4)
LYMPHOCYTES NFR BLD AUTO: 27.5 %
MCH RBC QN AUTO: 29.3 PG (ref 26–34)
MCHC RBC AUTO-ENTMCNC: 34 G/DL (ref 31–37)
MCV RBC AUTO: 86 FL
MONOCYTES # BLD AUTO: 0.49 X10(3) UL (ref 0.1–1)
MONOCYTES NFR BLD AUTO: 7.9 %
NEUTROPHILS # BLD AUTO: 3.88 X10 (3) UL (ref 1.5–7.7)
NEUTROPHILS # BLD AUTO: 3.88 X10(3) UL (ref 1.5–7.7)
NEUTROPHILS NFR BLD AUTO: 62.3 %
OSMOLALITY SERPL CALC.SUM OF ELEC: 289 MOSM/KG (ref 275–295)
PLATELET # BLD AUTO: 293 10(3)UL (ref 150–450)
POTASSIUM SERPL-SCNC: 4.9 MMOL/L (ref 3.5–5.1)
PROT SERPL-MCNC: 7.1 G/DL (ref 6.4–8.2)
RBC # BLD AUTO: 4.58 X10(6)UL
SODIUM SERPL-SCNC: 139 MMOL/L (ref 136–145)
WBC # BLD AUTO: 6.2 X10(3) UL (ref 4–11)

## 2024-02-19 PROCEDURE — 96374 THER/PROPH/DIAG INJ IV PUSH: CPT

## 2024-02-19 PROCEDURE — 80053 COMPREHEN METABOLIC PANEL: CPT | Performed by: PHYSICIAN ASSISTANT

## 2024-02-19 PROCEDURE — 99284 EMERGENCY DEPT VISIT MOD MDM: CPT

## 2024-02-19 PROCEDURE — 96375 TX/PRO/DX INJ NEW DRUG ADDON: CPT

## 2024-02-19 PROCEDURE — 93005 ELECTROCARDIOGRAM TRACING: CPT

## 2024-02-19 PROCEDURE — 83690 ASSAY OF LIPASE: CPT | Performed by: PHYSICIAN ASSISTANT

## 2024-02-19 PROCEDURE — 85025 COMPLETE CBC W/AUTO DIFF WBC: CPT | Performed by: PHYSICIAN ASSISTANT

## 2024-02-19 PROCEDURE — 93010 ELECTROCARDIOGRAM REPORT: CPT

## 2024-02-19 PROCEDURE — 96361 HYDRATE IV INFUSION ADD-ON: CPT

## 2024-02-19 PROCEDURE — 99285 EMERGENCY DEPT VISIT HI MDM: CPT

## 2024-02-19 RX ORDER — MORPHINE SULFATE 4 MG/ML
4 INJECTION, SOLUTION INTRAMUSCULAR; INTRAVENOUS ONCE
Status: COMPLETED | OUTPATIENT
Start: 2024-02-19 | End: 2024-02-19

## 2024-02-19 RX ORDER — IBUPROFEN 600 MG/1
600 TABLET ORAL EVERY 6 HOURS PRN
COMMUNITY

## 2024-02-19 RX ORDER — ONDANSETRON 2 MG/ML
4 INJECTION INTRAMUSCULAR; INTRAVENOUS ONCE
Status: COMPLETED | OUTPATIENT
Start: 2024-02-19 | End: 2024-02-19

## 2024-02-19 RX ORDER — TRAMADOL HYDROCHLORIDE 50 MG/1
TABLET ORAL EVERY 6 HOURS PRN
Qty: 10 TABLET | Refills: 0 | Status: SHIPPED | OUTPATIENT
Start: 2024-02-19 | End: 2024-02-24

## 2024-02-19 RX ORDER — ONDANSETRON 4 MG/1
4 TABLET, ORALLY DISINTEGRATING ORAL EVERY 4 HOURS PRN
Qty: 10 TABLET | Refills: 0 | Status: SHIPPED | OUTPATIENT
Start: 2024-02-19 | End: 2024-02-26

## 2024-02-19 RX ORDER — VALACYCLOVIR HYDROCHLORIDE 1 G/1
1000 TABLET, FILM COATED ORAL EVERY 12 HOURS SCHEDULED
COMMUNITY

## 2024-02-19 RX ORDER — ACYCLOVIR 800 MG/1
800 TABLET ORAL
Qty: 35 TABLET | Refills: 0 | Status: SHIPPED | OUTPATIENT
Start: 2024-02-19 | End: 2024-02-26

## 2024-02-19 NOTE — ED INITIAL ASSESSMENT (HPI)
Was dx with shingles on Friday to right hand and shingles continues to spread.  Reports unable to keep meds down due to nausea and vomiting since last noc.

## 2024-02-19 NOTE — ED PROVIDER NOTES
Patient Seen in: [facility] In [place]      History     Chief Complaint   Patient presents with    Nausea/Vomiting/Diarrhea     Stated Complaint: vomiting x 2 days, was diagnosed with shingles on Friday. have not been able to*    Subjective:   HPI    40-year-old female past medical history of hypertension presents to the urgency department due to right arm pain times 4 days.  Patient was evaluated in outside clinic diagnosed with shingles -she was placed on valacyclovir along with 600 mg of ibuprofen.  Patient's been attempting to take these medications but is causing significant stomach upset and now she is having persistent nausea or vomiting.  Denies any symptoms abdominal pain diarrhea or urinary complaints.  No fevers chest pain or shortness of breath.  She does complain of significant right arm pain shooting pain down the arm which started once the rash began.  Concerned as she cannot tolerate  the antiviral medication and she continues to have new vesicles on her hand     Objective:   Past Medical History:   Diagnosis Date    ADHD (attention deficit hyperactivity disorder)     Essential hypertension     Migraines               Past Surgical History:   Procedure Laterality Date    ADJUSTMENT GASTRIC BAND                        Social History     Socioeconomic History    Marital status: Single   Tobacco Use    Smoking status: Former     Packs/day: 0     Types: Cigarettes    Smokeless tobacco: Never   Vaping Use    Vaping Use: Never used   Substance and Sexual Activity    Alcohol use: Yes     Comment: occasional    Drug use: Never              Review of Systems   Constitutional: Negative.    Respiratory: Negative.     Cardiovascular: Negative.    Gastrointestinal:  Positive for abdominal pain, nausea and vomiting. Negative for diarrhea.   Skin:  Positive for rash.   Neurological: Negative.        Positive for stated complaint: vomiting x 2 days, was diagnosed with shingles on  Friday. have not been able to*  Other systems are as noted in HPI.  Constitutional and vital signs reviewed.      All other systems reviewed and negative except as noted above.    Physical Exam     ED Triage Vitals [02/19/24 1312]   /85   Pulse 76   Resp 20   Temp 97.2 °F (36.2 °C)   Temp src Temporal   SpO2 98 %   O2 Device None (Room air)       Current:BP (!) 163/79   Pulse 78   Temp 97.2 °F (36.2 °C) (Temporal)   Resp 20   Ht 167.6 cm (5' 6\")   Wt 131.5 kg   LMP 02/18/2024   SpO2 100%   BMI 46.81 kg/m²         Physical Exam  Vitals and nursing note reviewed.   Constitutional:       General: She is not in acute distress.     Appearance: She is well-developed. She is not toxic-appearing.   HENT:      Head: Normocephalic.   Eyes:      Extraocular Movements: Extraocular movements intact.      Pupils: Pupils are equal, round, and reactive to light.   Cardiovascular:      Rate and Rhythm: Normal rate and regular rhythm.   Pulmonary:      Effort: Pulmonary effort is normal.      Breath sounds: Normal breath sounds.   Abdominal:      General: Abdomen is flat. Bowel sounds are normal.      Palpations: Abdomen is soft.      Tenderness: There is no abdominal tenderness.   Musculoskeletal:      Comments: Right shoulder- no bony tenderness, full ROM. Right elbow- no bony tenderness, full rOM. Right forearm - compartments are soft. No streaking. Right hand  no edema, full ROM of all digits. Good cap refill and sensation is intact    Skin:     Capillary Refill: Capillary refill takes less than 2 seconds.      Comments: Vesicular rash on erythematous base along the right UE along the c7/c8 dermatome. No rash elsewhere   Neurological:      General: No focal deficit present.      Mental Status: She is alert and oriented to person, place, and time.   Psychiatric:         Mood and Affect: Mood normal.         Behavior: Behavior normal.               ED Course     Labs Reviewed   COMP METABOLIC PANEL (14) - Abnormal;  Notable for the following components:       Result Value    Glucose 101 (*)     A/G Ratio 0.9 (*)     All other components within normal limits   LIPASE - Normal   CBC WITH DIFFERENTIAL WITH PLATELET    Narrative:     The following orders were created for panel order CBC With Differential With Platelet.  Procedure                               Abnormality         Status                     ---------                               -----------         ------                     CBC W/ DIFFERENTIAL[[id number]]                              Final result                 Please view results for these tests on the individual orders.   CBC W/ DIFFERENTIAL                      MDM      48-year-old female presents to the emergency department due to nausea vomiting starting yesterday.  Believes it is from her antiviral medication valacyclovir as she was recently diagnosed with shingles.  Complains of right arm pain as a shooting pain.  No chest pain shortness of breath, abdominal pain diarrhea.      Ddx -gastritis, viral gastroenteritis, intra-abdominal infection, ACS        On exam the patient is in no acute distress.  Patient is slightly hypertensive but did not take her blood pressure medications today.  Patient was vomiting in the room -contents looked more similar to stomach acid.  No true assist.  Abdomen is soft and nontender.  She does have a vesicular rash on the right arm.  There is no signs of secondary infection.  Compartments are soft full range of motion no bony tenderness.  There is no rash elsewhere no concern for disseminated shingles.  EKG was performed showing sinus rhythm with sinus arrhythmia with occasional PACs, no acute changes from compared to previous EKGs.  Basic labs including CBC CMP and lipase were performed unremarkable.  Patient was given Pepcid Zofran and morphine for her pain with significant relief.  Unable to tolerate p.o. intake.  Exam is more consistent with potential gastritis/GI upset  from  her antiviral.  Give Zofran for home.  Will switch the valacyclovir to acyclovir as the patient says she has a difficult time swallowing due to the size of her pill.  Advised to take her blood pressure medication when she gets home.  Patient already has a follow-up appointment with her primary care doctor in the next few days and advised to keep this appointment and strict return precautions were discussed at length.                     Medical Decision Making  Problems Addressed:  Herpes zoster without complication: acute illness or injury    Amount and/or Complexity of Data Reviewed  Labs: ordered.  ECG/medicine tests: ordered. Decision-making details documented in ED Course.        Disposition and Plan     Clinical Impression:  1. Herpes zoster without complication    2. Nausea and vomiting in adult         Disposition:  Discharge  2/19/2024  3:29 pm    Follow-up:  Yeni Park MD  [address] [address]  [phone]    Schedule an appointment as soon as possible for a visit in 2 day(s)  recheck          Medications Prescribed:  Discharge Medication List as of 2/19/2024  3:38 PM        START taking these medications    Details   ondansetron 4 MG Oral Tablet Dispersible Take 1 tablet (4 mg total) by mouth every 4 (four) hours as needed for Nausea., Normal, Disp-10 tablet, R-0      traMADol 50 MG Oral Tab Take 1-2 tablets ( mg total) by mouth every 6 (six) hours as needed for Pain., Normal, Disp-10 tablet, R-0      acyclovir 800 MG Oral Tab Take 1 tablet (800 mg total) by mouth 5 (five) times daily for 7 days., Normal, Disp-35 tablet, R-0

## 2024-02-19 NOTE — DISCHARGE INSTRUCTIONS
Increase fluids and rest  Charlotte food - bananas, rice apple sauce and toast  Try the acyclovir instead   Since you stopped your antidepressant - can take tramadol with medication do not take together  Follow up with primary care doctor in 48 hours   Return to the ER if symptoms worsen

## 2024-02-20 LAB
ATRIAL RATE: 77 BPM
P AXIS: 22 DEGREES
P-R INTERVAL: 156 MS
Q-T INTERVAL: 380 MS
QRS DURATION: 78 MS
QTC CALCULATION (BEZET): 430 MS
R AXIS: -5 DEGREES
T AXIS: 25 DEGREES
VENTRICULAR RATE: 77 BPM

## 2025-07-15 ENCOUNTER — TELEPHONE (OUTPATIENT)
Dept: SURGERY | Facility: CLINIC | Age: 50
End: 2025-07-15

## 2025-07-15 NOTE — TELEPHONE ENCOUNTER
Referral received from: [facility]    DX: abnormal uterine and vaginal bleeding    Referral in binder at

## (undated) NOTE — LETTER
Date & Time: 11/4/2019, 1:49 PM  Patient: Vidhi Manzanares  Encounter Provider(s):    Charlotte Cooney MD       To Whom It May Concern:    Love Jaquez was seen and treated in our department on 11/4/2019. She should not return to school until 11/7.     If you

## (undated) NOTE — ED AVS SNAPSHOT
[facility] in [address] [place] [address]    Phone:  [phone]    Fax:  [address]   MRN: [id number]    Department:  [facility] in [place]   Date of Visit: IF THERE IS ANY CHANGE OR WORSENING OF YOUR CONDITION, CALL YOUR PRIMARY CARE PHYSICIAN AT ONCE OR RETURN IMMEDIATELY TO THE EMERGENCY DEPARTMENT.     If you have been prescribed any medication(s), please fill your prescription right away and begin taking t

## (undated) NOTE — LETTER
June 20, 2017    Patient: Jeanne Solo   Date of Visit: 6/20/2017       To Whom It May Concern:    Garrett Wilson was seen and treated in our emergency department on 6/20/2017. She can return to work tomorrow. .    If you have any questions or concern

## (undated) NOTE — ED AVS SNAPSHOT
Frida Eckert   MRN: [id number]    Department:  [facility] in [place]   Date of Visit:  11/4/2019           Disclosure     Insurance plans vary and the physician(s) referred by the ER may not be covered by your plan.  Please contac tell this physician (or your personal doctor if your instructions are to return to your personal doctor) about any new or lasting problems. The primary care or specialist physician will see patients referred from the [facility] Emergency Department.  Catarino Ramirez

## (undated) NOTE — ED AVS SNAPSHOT
[facility] Emergency Department in [address] [place] [address]    Phone:  [phone]    Fax:  [address]   MRN: [id number]    Department:  [facility] Emergency Department in [place]   Date of Visit: Take 1 capsule (20 mg total) by mouth daily.             Where to Get Your Medications      You can get these medications from any pharmacy     Bring a paper prescription for each of these medications    - omeprazole 20 MG Cpdr            Discharge Referenc [facility] Emergency Department. Follow-up care is at the discretion of that Physician. IF THERE IS ANY CHANGE OR WORSENING OF YOUR CONDITION, CALL YOUR PRIMARY CARE PHYSICIAN AT ONCE OR RETURN IMMEDIATELY TO THE EMERGENCY DEPARTMENT.     If you hav [phone]. - If you don’t have insurance, [employer] has partnered with [employer] [address] to help you get signed up for insurance coverage.   [employer] is a [employer] program that can help with your Affordab Impression:    CONCLUSION:    1. Unremarkable abdominal sonogram.           Dictated by: Kaelyn Nicole MD on 6/20/2017 at 8:47       Approved by: Kaelyn Nicole MD              Narrative:    PROCEDURE:  US ABDOMEN COMPLETE (CPT=76700)     COMPARISON:  None. not sign up before the expiration date, you must request a new code. Your unique [employer] Access Code: [email or web address]  Expires: 8/19/2017 11:01 AM    If you have questions, you can call [phone] to talk to our [facility] Staff.  Remember, [employer]